# Patient Record
Sex: MALE | Race: WHITE | URBAN - METROPOLITAN AREA
[De-identification: names, ages, dates, MRNs, and addresses within clinical notes are randomized per-mention and may not be internally consistent; named-entity substitution may affect disease eponyms.]

---

## 2017-02-01 ENCOUNTER — DOCUMENTATION ONLY (OUTPATIENT)
Dept: CARDIOLOGY | Facility: CLINIC | Age: 37
End: 2017-02-01

## 2017-02-01 NOTE — Clinical Note
February 1, 2017       TO: Delbert Woodard  [id number] [facility] [place] [id number]       Dear Delbert Woodard,    We are reviewing our outstanding orders.    Dr. Gorman has ordered labs and a follow up visit.      Please contact the scheduling desk at [phone] to arranged for an appointment.     If you have any questions, please call the [facility] nurse phone @ [phone]. If you had your lab work done at another facility, please give us a call so we can locate the results.     Thank you  [facility] nurses